# Patient Record
Sex: FEMALE | Race: WHITE | NOT HISPANIC OR LATINO | Employment: FULL TIME | URBAN - METROPOLITAN AREA
[De-identification: names, ages, dates, MRNs, and addresses within clinical notes are randomized per-mention and may not be internally consistent; named-entity substitution may affect disease eponyms.]

---

## 2021-08-25 ENCOUNTER — TRANSFERRED RECORDS (OUTPATIENT)
Dept: MULTI SPECIALTY CLINIC | Facility: CLINIC | Age: 36
End: 2021-08-25

## 2021-08-25 LAB
HPV ABSTRACT: NORMAL
PAP-ABSTRACT: NORMAL

## 2022-05-17 ENCOUNTER — OFFICE VISIT (OUTPATIENT)
Dept: DERMATOLOGY | Facility: CLINIC | Age: 37
End: 2022-05-17
Payer: COMMERCIAL

## 2022-05-17 DIAGNOSIS — R21 RASH AND NONSPECIFIC SKIN ERUPTION: ICD-10-CM

## 2022-05-17 DIAGNOSIS — L20.9 AD (ATOPIC DERMATITIS): Primary | ICD-10-CM

## 2022-05-17 PROCEDURE — 88305 TISSUE EXAM BY PATHOLOGIST: CPT | Mod: 26 | Performed by: DERMATOLOGY

## 2022-05-17 PROCEDURE — 88312 SPECIAL STAINS GROUP 1: CPT | Mod: TC | Performed by: DERMATOLOGY

## 2022-05-17 PROCEDURE — 11104 PUNCH BX SKIN SINGLE LESION: CPT | Performed by: DERMATOLOGY

## 2022-05-17 PROCEDURE — 99203 OFFICE O/P NEW LOW 30 MIN: CPT | Mod: 25 | Performed by: DERMATOLOGY

## 2022-05-17 RX ORDER — CLOBETASOL PROPIONATE 0.5 MG/G
OINTMENT TOPICAL 2 TIMES DAILY
Qty: 60 G | Refills: 3 | Status: SHIPPED | OUTPATIENT
Start: 2022-05-17

## 2022-05-17 RX ORDER — CETIRIZINE HYDROCHLORIDE 5 MG/1
5 TABLET ORAL DAILY
COMMUNITY

## 2022-05-17 RX ORDER — TRIAMCINOLONE ACETONIDE 1 MG/G
OINTMENT TOPICAL
COMMUNITY
Start: 2021-03-16

## 2022-05-17 RX ORDER — FEXOFENADINE HCL 180 MG/1
180 TABLET ORAL DAILY
COMMUNITY

## 2022-05-17 ASSESSMENT — PAIN SCALES - GENERAL
PAINLEVEL: NO PAIN (0)
PAINLEVEL: MILD PAIN (3)

## 2022-05-17 NOTE — PATIENT INSTRUCTIONS
Recommendations for dry skin and dermatitis   1. Bathe or shower daily in lukewarm water  2. Use a gentle non-soap detergent cleanser  - Soaps are alkaline (which can irritate sensitive skin) and remove natural moisturizing factors   - Recommended products, in no particular order, include:   - Bars:    - Aveeno Moisturizing Bar    - Cetaphil Gentle Cleansing Bar    - Dove Sensitive Skin Unscented Beauty Bar    - Olay Ultra Moisture Bar   - Liquid Cleansers:    - Aquanil Cleanser    - CeraVe Hydrating Cleanser    - Cetaphil Gentle Skin Cleanser  - Avoid scented soaps or bath additives unless your doctor tells you otherwise  - Focus on washing the face, underarms, and underwear areas; other sites usually do not need frequent washing  3. Rinse off thoroughly, then pat dry until skin is slightly damp  4. Apply moisturizer to damp skin within 3-5 minutes of exiting the bath/shower  - Recommended products, in no particular order, include:   - Lotions (thinner/lighter, but may be less effective)    - AmLactin Cerapeutic Restoring Body Lotion    - CeraVe Facial Moisturizing Lotion (AM and/or PM)    - Lubriderm Advanced Therapy Lotion   - Creams (thicker, likely the best balance of effectiveness and feel)    - AmLactin Ultra Hydrating Body Cream    - Aveeno Eczema Therapy Moisturizing Cream    - Aveeno Eczema Therapy Itch Relief Balm    - CeraVe Itch Relief Moisturizing Cream   - Ointments (thickest)    - Vaseline  5. If prescribed a topical steroid medication, this may be applied before or after the moisturizer (whichever order you prefer)  6. Reapply moisturizer one or two additional times throughout the day when dry skin is present; once this improves, reduce to daily or every other day as needed to prevent recurrence  7. If dry skin or dermatitis is present on the hands, keep moisturizer near the sink and apply after washing and drying your hands  8. A humidifier may be helpful during the winter months (when ambient  humidity is very low)    Wound Care After a Biopsy    What is a skin biopsy?  A skin biopsy allows the doctor to examine a very small piece of tissue under the microscope to determine the diagnosis and the best treatment for the skin condition. A local anesthetic (numbing medicine)  is injected with a very small needle into the skin area to be tested. A small piece of skin is taken from the area. Sometimes a suture (stitch) is used.     What are the risks of a skin biopsy?  I will experience scar, bleeding, swelling, pain, crusting and redness. I may experience incomplete removal or recurrence. Risks of this procedure are excessive bleeding, bruising, infection, nerve damage, numbness, thick (hypertrophic or keloidal) scar and non-diagnostic biopsy.    How should I care for my wound for the first 24 hours?  Keep the wound dry and covered for 24 hours  If it bleeds, hold direct pressure on the area for 15 minutes. If bleeding does not stop then go to the emergency room  Avoid strenuous exercise the first 1-2 days or as your doctor instructs you    How should I care for the wound after 24 hours?  After 24 hours, remove the bandage  You may bathe or shower as normal  If you had a scalp biopsy, you can shampoo as usual and can use shower water to clean the biopsy site daily  Clean the wound twice a day with gentle soap and water  Do not scrub, be gentle  Apply white petroleum/Vaseline after cleaning the wound with a cotton swab or a clean finger, and keep the site covered with a Bandaid /bandage. Bandages are not necessary with a scalp biopsy  If you are unable to cover the site with a Bandaid /bandage, re-apply ointment 2-3 times a day to keep the site moist. Moisture will help with healing  Avoid strenuous activity for first 1-2 days  Avoid lakes, rivers, pools, and oceans until the stitches are removed or the site is healed    How do I clean my wound?  Wash hands thoroughly with soap or use hand  before all  wound care  Clean the wound with gentle soap and water  Apply white petroleum/Vaseline  to wound after it is clean  Replace the Bandaid /bandage to keep the wound covered for the first few days or as instructed by your doctor  If you had a scalp biopsy, warm shower water to the area on a daily basis should suffice    What should I use to clean my wound?   Cotton-tipped applicators (Qtips )  White petroleum jelly (Vaseline ). Use a clean new container and use Q-tips to apply.  Bandaids   as needed  Gentle soap     How should I care for my wound long term?  Do not get your wound dirty  Keep up with wound care for one week or until the area is healed.  A small scab will form and fall off by itself when the area is completely healed. The area will be red and will become pink in color as it heals. Sun protection is very important for how your scar will turn out. Sunscreen with an SPF 30 or greater is recommended once the area is healed.  If you have stitches, stitches need to be removed in 14 days. You may return to our clinic for this or you may have it done locally at your doctor s office.  You should have some soreness but it should be mild and slowly go away over several days. Talk to your doctor about using tylenol for pain,    When should I call my doctor?  If you have increased:   Pain or swelling  Pus or drainage (clear or slightly yellow drainage is ok)  Temperature over 100F  Spreading redness or warmth around wound    When will I hear about my results?  The biopsy results can take 2 weeks to come back.  Your results will automatically release to [employer] before your provider has even reviewed them.  The clinic will call you with the results, send you a [employer] message, or have you schedule a follow-up clinic or phone time to discuss the results.  Contact our clinics if you do not hear from us in 2 weeks.    Who should I call with questions?  [facility]: [phone]  [place]  [facility]: [phone]  For urgent needs outside of business hours call the [facility] at [phone] and ask for the dermatology resident on call

## 2022-05-17 NOTE — PROGRESS NOTES
[facility] Dermatology Note  Encounter Date: May 17, 2022  Office Visit     Dermatology Problem List:  # History of cutaneous mastocytosis   # Dermatitis   - s/p biopsy showing spongiotic dermatitis with eosinophils on 1/5/21  - TMC 0.1% ointment, clobetasol 0.05% ointment  # Rash, ddx LSC vs LP  - s/p punch bx 5/17/22  - clobetasol 0.05% ointment  ____________________________________________    Assessment & Plan:    # Atopic dermatitis, ddx contact dermatitis   This could be related to an allergy, however I counseled that this can often be difficult to pin point as an exposure every 3 weeks is enough to cause continuous rash. Patient previously discussed patch testing with an outside dermatologist, Dr Fernandez, however patient understandably was not interested in pursuing this. Either way, would treat with a topical steroid. I will prescribe a stronger topical steroid than TMC 0.1% as she is needing to use this continuously to prevent any recurrence. Steroid precautions discussed.   - Dry skin cares discussed and hand out provided  - Start applying clobetasol 0.05% ointment twice daily, tapering with improvement.     # Rash of uncertain etiology, favor lichen simplex chronicus versus lichen planus. Recommended a biopsy for more definitive diagnosis.   - Punch biopsy today, see procedure note below  - Start applying clobetasol 0.05% ointment twice daily, tapering with improvement.     Procedures Performed:   - Punch biopsy procedure note, location(s): L calf. After discussion of benefits and risks including but not limited to bleeding, infection, scar, incomplete removal, recurrence, and non-diagnostic biopsy, written consent and photographs were obtained. The area was cleaned with isopropyl alcohol. 0.5mL of 1% lidocaine with epinephrine was injected to obtain adequate anesthesia and a 4 mm punch biopsy was performed at site(s). 4-0 Prolene sutures were utilized to approximate the epidermal  edges. White petrolatum ointment and a bandage was applied to the wound. Explicit verbal and written wound care instructions were provided. The patient left the dermatology clinic in good condition.     Follow-up: 3 month(s) in-person, or earlier for new or changing lesions    Staff and Scribe:     Scribe Disclosure:  I, Radhika Olivaroughs, am serving as a scribe to document services personally performed by Blaine Fernandez MD based on data collection and the provider's statements to me.     Staff attestation:  The documentation recorded by the scribe accurately reflects the services I personally performed and the decisions I personally made. I have made edits where needed.    Blaine Fernandez MD  Staff Dermatologist and Dermatopathologist  , Department of Dermatology  ____________________________________________    CC: Skin Check (Jessica is here for a skin check and has a rash on her chest and left lower leg that she would like to get checked.)    HPI:  Ms. Cecily Fuentes is a(n) 36 year old female who presents today as a new patient for a few rashes. First, she reports an itchy rash on her central chest. This was biopsied by Dr. Fernandez and it returned as dermatitis. It responds okay to TMC 1-2x daily.  It does not seem to be correlated to wearing a bra. She moisturizes regularly but not daily. She also uses Dove sensitive skin products for washing. 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 For this generalized irritation, she takes Zyrtec and Allegra daily. The spots she had as a baby have faded. She does not believe she has had hives as an adult. No chronic diarrhea. Mom and brother have a history of asthma. No personal history of eczema or asthma.     She points to another rash on her L leg that started about 6 months ago. She has been applying a heavy cream to the area which keeps it at bay. The rash can  by dry and itchy. Patient is otherwise feeling well, without additional skin concerns.    Labs Reviewed:  N/A    Physical Exam:  Vitals: There were no vitals taken for this visit.  SKIN: Focused examination of chest and lower extremities was performed.  - Scattered ecthymatous papules, some in a linear arrangement beneath the L breast. Follicular prominence and a papular eczema appearance on the L chest.   - On the L lateral calf, there is a contiguous cluster of violaceous papules with whitish scale and exaggerated skin lines.  - No other lesions of concern on areas examined.     Medications:  Current Outpatient Medications   Medication     cetirizine (ZYRTEC) 5 MG tablet     fexofenadine (ALLEGRA) 180 MG tablet     triamcinolone (KENALOG) 0.1 % external ointment     No current facility-administered medications for this visit.      Past Medical History:   There is no problem list on file for this patient.    No past medical history on file.     CC Referred Self, MD  No address on file on close of this encounter.

## 2022-05-17 NOTE — LETTER
5/17/2022       RE: Cecily Fuentes  [address]  [facility] [id number]     Dear Colleague,    Thank you for referring your patient, Cecily Fuentes, to the [facility] DERMATOLOGY CLINIC [place] at [facility]. Please see a copy of my visit note below.    [facility] Dermatology Note  Encounter Date: May 17, 2022  Office Visit     Dermatology Problem List:  # History of cutaneous mastocytosis   # Dermatitis   - s/p biopsy showing spongiotic dermatitis with eosinophils on 1/5/21  - TMC 0.1% ointment, clobetasol 0.05% ointment  # Rash, ddx LSC vs LP  - s/p punch bx 5/17/22  - clobetasol 0.05% ointment  ____________________________________________    Assessment & Plan:    # Atopic dermatitis, ddx contact dermatitis   This could be related to an allergy, however I counseled that this can often be difficult to pin point as an exposure every 3 weeks is enough to cause continuous rash. Patient previously discussed patch testing with an outside dermatologist, Dr Fernandez, however patient understandably was not interested in pursuing this. Either way, would treat with a topical steroid. I will prescribe a stronger topical steroid than TMC 0.1% as she is needing to use this continuously to prevent any recurrence. Steroid precautions discussed.   - Dry skin cares discussed and hand out provided  - Start applying clobetasol 0.05% ointment twice daily, tapering with improvement.     # Rash of uncertain etiology, favor lichen simplex chronicus versus lichen planus. Recommended a biopsy for more definitive diagnosis.   - Punch biopsy today, see procedure note below  - Start applying clobetasol 0.05% ointment twice daily, tapering with improvement.     Procedures Performed:   - Punch biopsy procedure note, location(s): L calf. After discussion of benefits and risks including but not limited to bleeding, infection, scar, incomplete removal,  recurrence, and non-diagnostic biopsy, written consent and photographs were obtained. The area was cleaned with isopropyl alcohol. 0.5mL of 1% lidocaine with epinephrine was injected to obtain adequate anesthesia and a 4 mm punch biopsy was performed at site(s). 4-0 Prolene sutures were utilized to approximate the epidermal edges. White petrolatum ointment and a bandage was applied to the wound. Explicit verbal and written wound care instructions were provided. The patient left the dermatology clinic in good condition.     Follow-up: 3 month(s) in-person, or earlier for new or changing lesions    Staff and Scribe:     Scribe Disclosure:  I, Radhika Beckman, am serving as a scribe to document services personally performed by Blaine Fernandez MD based on data collection and the provider's statements to me.     Staff attestation:  The documentation recorded by the scribe accurately reflects the services I personally performed and the decisions I personally made. I have made edits where needed.    Blaine Fernandez MD  Staff Dermatologist and Dermatopathologist  , Department of Dermatology  ____________________________________________    CC: Skin Check (Jessica is here for a skin check and has a rash on her chest and left lower leg that she would like to get checked.)    HPI:  Ms. Cecily Fuentes is a(n) 36 year old female who presents today as a new patient for a few rashes. First, she reports an itchy rash on her central chest. This was biopsied by Dr. Fernandez and it returned as dermatitis. It responds okay to TMC 1-2x daily.  It does not seem to be correlated to wearing a bra. She moisturizes regularly but not daily. She also uses Dove sensitive skin products for washing. 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 For this generalized irritation, she takes Zyrtec and Allegra daily. The spots she had as a baby have  faded. She does not believe she has had hives as an adult. No chronic diarrhea. Mom and brother have a history of asthma. No personal history of eczema or asthma.     She points to another rash on her L leg that started about 6 months ago. She has been applying a heavy cream to the area which keeps it at bay. The rash can by dry and itchy. Patient is otherwise feeling well, without additional skin concerns.    Labs Reviewed:  N/A    Physical Exam:  Vitals: There were no vitals taken for this visit.  SKIN: Focused examination of chest and lower extremities was performed.  - Scattered ecthymatous papules, some in a linear arrangement beneath the L breast. Follicular prominence and a papular eczema appearance on the L chest.   - On the L lateral calf, there is a contiguous cluster of violaceous papules with whitish scale and exaggerated skin lines.  - No other lesions of concern on areas examined.     Medications:  Current Outpatient Medications   Medication     cetirizine (ZYRTEC) 5 MG tablet     fexofenadine (ALLEGRA) 180 MG tablet     triamcinolone (KENALOG) 0.1 % external ointment     No current facility-administered medications for this visit.      Past Medical History:   There is no problem list on file for this patient.    No past medical history on file.     ROMERO Referred Self, MD  No address on file on close of this encounter.      Lidocaine-epinephrine 1-1:661092 % injection   1mL once for one use, starting 5/17/2022 ending 5/17/2022,  2mL disp, R-0, injection  Injected by RASHEEDA Ledezma          Again, thank you for allowing me to participate in the care of your patient.      Sincerely,    Blaine Fernandez MD

## 2022-05-17 NOTE — PROGRESS NOTES
Lidocaine-epinephrine 1-1:883524 % injection   1mL once for one use, starting 5/17/2022 ending 5/17/2022,  2mL disp, R-0, injection  Injected by RASHEEDA Ledezma

## 2022-05-17 NOTE — LETTER
Date:May 31, 2022      Patient was self referred, no letter generated. Do not send.        [facility] Health Information

## 2022-05-19 LAB
PATH REPORT.COMMENTS IMP SPEC: NORMAL
PATH REPORT.FINAL DX SPEC: NORMAL
PATH REPORT.GROSS SPEC: NORMAL
PATH REPORT.MICROSCOPIC SPEC OTHER STN: NORMAL
PATH REPORT.RELEVANT HX SPEC: NORMAL

## 2022-05-29 ENCOUNTER — HEALTH MAINTENANCE LETTER (OUTPATIENT)
Age: 37
End: 2022-05-29

## 2022-08-30 ENCOUNTER — OFFICE VISIT (OUTPATIENT)
Dept: DERMATOLOGY | Facility: CLINIC | Age: 37
End: 2022-08-30
Payer: COMMERCIAL

## 2022-08-30 DIAGNOSIS — L43.0 HYPERTROPHIC LICHEN PLANUS: Primary | ICD-10-CM

## 2022-08-30 DIAGNOSIS — D22.9 MULTIPLE BENIGN MELANOCYTIC NEVI: ICD-10-CM

## 2022-08-30 DIAGNOSIS — L81.4 SOLAR LENTIGO: ICD-10-CM

## 2022-08-30 PROCEDURE — 99213 OFFICE O/P EST LOW 20 MIN: CPT | Performed by: DERMATOLOGY

## 2022-08-30 ASSESSMENT — PAIN SCALES - GENERAL: PAINLEVEL: NO PAIN (0)

## 2022-08-30 NOTE — NURSING NOTE
Dermatology Rooming Note    Cecily Fuentes's goals for this visit include:   Chief Complaint   Patient presents with     Skin Check     Jessica is here for a 3 month skin check.      Dilia Taylor CMA

## 2022-08-30 NOTE — PROGRESS NOTES
[facility] Dermatology Clinic Note - EP    Encounter Date: Aug 30, 2022    Dermatology Problem List:  #. Hypertrophic lichen planus, L shin - biopsy proven  - improved with clobetasol ointment  #. Benign skin checks    ____________________________________________    Assessment & Plan:     #. Hypertrophic lichen planus, L shin - improved  - continue clobetasol 0.05% ointment     #. Benign melanocytic nevi of the trunk  #. Solar lentigines  - reassurance provided; no lesions concerning for malignancy  - photoprotection (regular use of SPF30+ broad spectrum sunscreen and sun protective clothing) recommended  - ABCDE of melanoma discussed    Procedures Performed:  None    Follow-up: 1 year     Blaine Fernandez MD  Dermatology/Dermatopathology Staff Physician  , Department of Dermatology    ____________________________________________    CC: Skin Check (Jessica is here for a 3 month skin check. )      HPI:  Ms. Cecily Fuentes is a(n) extremely pleasant 36 year old female who presents today as a new patient for FBSE. Notes no new lesions of concern.    Repots clobetasol ointment helpful. Using intermittently, has flattened quite a bit and now is asymptomatic.    The patient denies any painful, bleeding, or nonhealing lesions, or any new or changing moles.    Patient is otherwise feeling well, without additional skin concerns.     Labs Reviewed:  N/A    Physical Exam:  Vitals: There were no vitals taken for this visit.  SKIN: Total skin excluding the undergarment areas was performed. The exam included the head/face, neck, both arms, chest, back, abdomen, both legs, digits and/or nails.   - Hyperpigmented thin plaque on the L shin; dramatically improved, nearly flat, no violaceous hue, no signs of activity  - Multiple regular brown pigmented macules and papules are identified on the trunk and extremities.   - There are waxy stuck on tan to brown papules on the trunk and extremities.  - No other  lesions of concern on areas examined.     Medications:  Current Outpatient Medications   Medication     cetirizine (ZYRTEC) 5 MG tablet     clobetasol (TEMOVATE) 0.05 % external ointment     fexofenadine (ALLEGRA) 180 MG tablet     triamcinolone (KENALOG) 0.1 % external ointment     No current facility-administered medications for this visit.      Past Medical History:   There is no problem list on file for this patient.    No past medical history on file.    CC Blaine Fernandez MD  [address] [place] [phone] on close of this encounter.    This note has been created using voice recognition software; while it has been reviewed, some errors may persist.

## 2022-08-30 NOTE — LETTER
8/30/2022       RE: Cecily Fuentes  [address]  [facility] [id number]     Dear Colleague,    Thank you for referring your patient, Cecily Fuentes, to the [facility] DERMATOLOGY CLINIC [place] at [facility]. Please see a copy of my visit note below.    [facility] Dermatology Clinic Note - EP    Encounter Date: Aug 30, 2022    Dermatology Problem List:  #. Hypertrophic lichen planus, L shin - biopsy proven  - improved with clobetasol ointment  #. Benign skin checks    ____________________________________________    Assessment & Plan:     #. Hypertrophic lichen planus, L shin - improved  - continue clobetasol 0.05% ointment     #. Benign melanocytic nevi of the trunk  #. Solar lentigines  - reassurance provided; no lesions concerning for malignancy  - photoprotection (regular use of SPF30+ broad spectrum sunscreen and sun protective clothing) recommended  - ABCDE of melanoma discussed    Procedures Performed:  None    Follow-up: 1 year     Blaine Fernandez MD  Dermatology/Dermatopathology Staff Physician  , Department of Dermatology    ____________________________________________    CC: Skin Check (Jessica is here for a 3 month skin check. )      HPI:  Ms. Cecily Fuentes is a(n) extremely pleasant 36 year old female who presents today as a new patient for FBSE. Notes no new lesions of concern.    Repots clobetasol ointment helpful. Using intermittently, has flattened quite a bit and now is asymptomatic.    The patient denies any painful, bleeding, or nonhealing lesions, or any new or changing moles.    Patient is otherwise feeling well, without additional skin concerns.     Labs Reviewed:  N/A    Physical Exam:  Vitals: There were no vitals taken for this visit.  SKIN: Total skin excluding the undergarment areas was performed. The exam included the head/face, neck, both arms, chest, back, abdomen, both legs, digits  and/or nails.   - Hyperpigmented thin plaque on the L shin; dramatically improved, nearly flat, no violaceous hue, no signs of activity  - Multiple regular brown pigmented macules and papules are identified on the trunk and extremities.   - There are waxy stuck on tan to brown papules on the trunk and extremities.  - No other lesions of concern on areas examined.     Medications:  Current Outpatient Medications   Medication     cetirizine (ZYRTEC) 5 MG tablet     clobetasol (TEMOVATE) 0.05 % external ointment     fexofenadine (ALLEGRA) 180 MG tablet     triamcinolone (KENALOG) 0.1 % external ointment     No current facility-administered medications for this visit.      Past Medical History:   There is no problem list on file for this patient.    No past medical history on file.    CC Blaine Fernandez MD  [address] [place] [phone] on close of this encounter.    This note has been created using voice recognition software; while it has been reviewed, some errors may persist.       Again, thank you for allowing me to participate in the care of your patient.      Sincerely,    Blaine Fernandez MD

## 2022-08-30 NOTE — LETTER
Date:August 30, 2022      Provider requested that no letter be sent. Do not send.       [facility]

## 2022-08-30 NOTE — PROGRESS NOTES
[facility] Dermatology Clinic Note - EP    Encounter Date: Aug 30, 2022    Dermatology Problem List:  #. ***    ____________________________________________    Assessment & Plan:     #. ***   {kkplans:518121}  - ***     #. Benign melanocytic nevi of the trunk  #. Seborrheic keratoses  - reassurance provided; no lesions concerning for malignancy  - photoprotection (regular use of SPF30+ broad spectrum sunscreen and sun protective clothing) recommended  - ABCDE of melanoma discussed    Procedures Performed:   {kkprocedurenotes:940924}  {kkprocedurenotes:140794}    Follow-up: ***    Blaine Fernandez MD  Dermatology/Dermatopathology Staff Physician  , Department of Dermatology    ____________________________________________    CC: Skin Check (Jessica is here for a 3 month skin check. )      HPI:  Ms. Cecily Fuentes is a(n) extremely pleasant 36 year old female who presents today as a new patient for FBSE. Notes no new lesions of concern.    The patient denies any painful, bleeding, or nonhealing lesions, or any new or changing moles.    Patient is otherwise feeling well, without additional skin concerns.     Labs Reviewed:  N/A    Physical Exam:  Vitals: There were no vitals taken for this visit.  SKIN: Total skin excluding the undergarment areas was performed. The exam included the head/face, neck, both arms, chest, back, abdomen, both legs, digits and/or nails.   - {Skin Exam Derm:482484}  - Multiple regular brown pigmented macules and papules are identified on the trunk and extremities.   - There are waxy stuck on tan to brown papules on the trunk and extremities.  - No other lesions of concern on areas examined.     Medications:  Current Outpatient Medications   Medication     cetirizine (ZYRTEC) 5 MG tablet     clobetasol (TEMOVATE) 0.05 % external ointment     fexofenadine (ALLEGRA) 180 MG tablet     triamcinolone (KENALOG) 0.1 % external ointment     No current facility-administered  medications for this visit.      Past Medical History:   There is no problem list on file for this patient.    No past medical history on file.    CC Blaine Fernandez MD  [address] [place] [address] on close of this encounter.    This note has been created using voice recognition software; while it has been reviewed, some errors may persist.

## 2022-10-03 ENCOUNTER — HEALTH MAINTENANCE LETTER (OUTPATIENT)
Age: 37
End: 2022-10-03

## 2023-01-25 ENCOUNTER — TELEPHONE (OUTPATIENT)
Dept: DERMATOLOGY | Facility: CLINIC | Age: 38
End: 2023-01-25

## 2023-06-04 ENCOUNTER — HEALTH MAINTENANCE LETTER (OUTPATIENT)
Age: 38
End: 2023-06-04

## 2023-10-03 ENCOUNTER — OFFICE VISIT (OUTPATIENT)
Dept: DERMATOLOGY | Facility: CLINIC | Age: 38
End: 2023-10-03
Payer: COMMERCIAL

## 2023-10-03 DIAGNOSIS — D22.9 MULTIPLE BENIGN MELANOCYTIC NEVI: ICD-10-CM

## 2023-10-03 DIAGNOSIS — L81.4 SOLAR LENTIGO: ICD-10-CM

## 2023-10-03 DIAGNOSIS — L43.0 HYPERTROPHIC LICHEN PLANUS: Primary | ICD-10-CM

## 2023-10-03 PROCEDURE — 99213 OFFICE O/P EST LOW 20 MIN: CPT | Performed by: DERMATOLOGY

## 2023-10-03 ASSESSMENT — PAIN SCALES - GENERAL: PAINLEVEL: NO PAIN (0)

## 2023-10-03 NOTE — LETTER
10/3/2023       RE: Cecily Fuentes  [address]  [facility] [id number]     Dear Colleague,    Thank you for referring your patient, Cecily Fuentes, to the [facility]ERMAT[facility] [place] [facility]. Please see a copy of my visit note below.    [facility] Dermatology Note  Encounter Date: Oct 3, 2023  Office Visit     Dermatology Problem List:  1. Hypertrophic lichen planus, L shin - biopsy proven  - improved with clobetasol ointment  2. 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 Benign skin checks    ____________________________________________    Assessment & Plan:    # Hypertrophic lichen planus, left shin, improved  - Had been using clobetasol for itching, has since ceased  - Counseled to resume clobetasol if itching returns, increases in elevation, or returns to blue-tinged coloration  - Continue to monitor    # Cherry angiomas, melanocytic nevi, intradermal nevus, seborrheic keratoses.   - NTD: No further management at this time.  - Reassured of benign etiology     # Dermatitis, resolved  - s/p biopsy showing spongiotic dermatitis with eosinophils on 1/5/21  - previous treatment: TMC 0.1% ointment  - resolved with clobetasol 0.05% ointment  - No current areas of scale or itching  - Dry skin cares discussed and hand out provided    Procedures Performed:   None    Follow-up: 1 year(s) in-person, or earlier for new or changing lesions    Staff and Scribe:   I, DELLA HUMPHREY, am serving as a scribe; to document services personally performed by Blaine Fernandez MD -based on data collection and the provider's statements to me.    I Delmy ePrry, saw and examined the patient in the presence of Dr. Fernandez.    Staff Physician:  I was present with the medical student who participated in the service and in the  documentation of the note. I have verified the history and personally performed the physical exam and medical decision making. I agree with the assessment and plan of care as documented in the note.     Blaine Fernandez MD  Staff Dermatologist and Dermatopathologist  , Department of Dermatology   ____________________________________________    CC: Skin Check (FBSC - patient reports no spots of concern. Patient mentions that their scalp becomes sensitive sometimes. )    HPI:  Ms. Cecily Fuentes is a(n) 37 year old female with a history of eczema and hypertrophic lichen planus who presents today as a return patient for FBSE. Jessica reports the lichen planus of her left shin has been much improved. The rash that was previously biopsied on her chest has also resolved following treatment with clobetasol topicals and has not recurred. Currently, there is no itching and the area has flattened. However, there is still significant hyperpigmentation of the area. She has stopped using her clobetasol cream for several months without changes. She denies any rough spots in her mouth or changes in her nails. She does indicate some scalp discomfort to her left posterior head that is improved when she washes with her dandruff shampoo.     Patient is otherwise feeling well, without additional skin concerns.    Labs Reviewed:  N/A    Physical Exam:  Vitals: There were no vitals taken for this visit.  SKIN: Total skin excluding the undergarment areas was performed. The exam included the head/face, neck, both arms, chest, back, abdomen, both legs, digits and/or nails.   - Mild, intermittent flaky skin on scalp  - Hyperpigmented irregular patch of left shin without overlying scale or palpability  - There are dome shaped bright red papules on the trunk and extremities.   - There is a skin colored fleshy papule(s) located on the trunk and extremities.  - Multiple regular brown pigmented macules and papules are  identified on the trunk and extremities.   - There are waxy stuck on tan to brown papules on the face, trunk and extremities.  - No other lesions of concern on areas examined.     Medications:  Current Outpatient Medications   Medication    cetirizine (ZYRTEC) 5 MG tablet    clobetasol (TEMOVATE) 0.05 % external ointment    fexofenadine (ALLEGRA) 180 MG tablet    triamcinolone (KENALOG) 0.1 % external ointment     No current facility-administered medications for this visit.      Past Medical History:   Right ACL surgery       CC Referred Self, MD  No address on file on close of this encounter.

## 2023-10-03 NOTE — PROGRESS NOTES
[facility] Dermatology Note  Encounter Date: Oct 3, 2023  Office Visit     Dermatology Problem List:  1. Hypertrophic lichen planus, L shin - biopsy proven  - improved with clobetasol ointment  2. 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 Benign skin checks    ____________________________________________    Assessment & Plan:    # Hypertrophic lichen planus, left shin, improved  - Had been using clobetasol for itching, has since ceased  - Counseled to resume clobetasol if itching returns, increases in elevation, or returns to blue-tinged coloration  - Continue to monitor    # Cherry angiomas, melanocytic nevi, intradermal nevus, seborrheic keratoses.   - NTD: No further management at this time.  - Reassured of benign etiology     # Dermatitis, resolved  - s/p biopsy showing spongiotic dermatitis with eosinophils on 1/5/21  - previous treatment: TMC 0.1% ointment  - resolved with clobetasol 0.05% ointment  - No current areas of scale or itching  - Dry skin cares discussed and hand out provided    Procedures Performed:   None    Follow-up: 1 year(s) in-person, or earlier for new or changing lesions    Staff and Scribe:   IDELLA, am serving as a scribe; to document services personally performed by Blaine Fernandez MD -based on data collection and the provider's statements to me.    I Delmy Perry, saw and examined the patient in the presence of Dr. Fernandez.    Staff Physician:  I was present with the medical student who participated in the service and in the documentation of the note. I have verified the history and personally performed the physical exam and medical decision making. I agree with the assessment and plan of care as documented in the note.     Blaine Fernandez MD  Staff Dermatologist and Dermatopathologist  , Department of Dermatology    ____________________________________________    CC: Skin Check (FBSC - patient reports no spots of concern. Patient mentions that their scalp becomes sensitive sometimes. )    HPI:  Ms. Cecily Fuentes is a(n) 37 year old female with a history of eczema and hypertrophic lichen planus who presents today as a return patient for FBSE. Jessica reports the lichen planus of her left shin has been much improved. The rash that was previously biopsied on her chest has also resolved following treatment with clobetasol topicals and has not recurred. Currently, there is no itching and the area has flattened. However, there is still significant hyperpigmentation of the area. She has stopped using her clobetasol cream for several months without changes. She denies any rough spots in her mouth or changes in her nails. She does indicate some scalp discomfort to her left posterior head that is improved when she washes with her dandruff shampoo.     Patient is otherwise feeling well, without additional skin concerns.    Labs Reviewed:  N/A    Physical Exam:  Vitals: There were no vitals taken for this visit.  SKIN: Total skin excluding the undergarment areas was performed. The exam included the head/face, neck, both arms, chest, back, abdomen, both legs, digits and/or nails.   - Mild, intermittent flaky skin on scalp  - Hyperpigmented irregular patch of left shin without overlying scale or palpability  - There are dome shaped bright red papules on the trunk and extremities.   - There is a skin colored fleshy papule(s) located on the trunk and extremities.  - Multiple regular brown pigmented macules and papules are identified on the trunk and extremities.   - There are waxy stuck on tan to brown papules on the face, trunk and extremities.  - No other lesions of concern on areas examined.     Medications:  Current Outpatient Medications   Medication     cetirizine (ZYRTEC) 5 MG tablet     clobetasol (TEMOVATE) 0.05 % external  ointment     fexofenadine (ALLEGRA) 180 MG tablet     triamcinolone (KENALOG) 0.1 % external ointment     No current facility-administered medications for this visit.      Past Medical History:   Right ACL surgery       CC Referred Self, MD  No address on file on close of this encounter.

## 2023-10-03 NOTE — NURSING NOTE
Dermatology Rooming Note    Cecily Fuentes's goals for this visit include:   Chief Complaint   Patient presents with    Skin Check     FBS - patient reports no spots of concern. Patient mentions that their scalp becomes sensitive sometimes.      Nallely August

## 2024-02-23 ENCOUNTER — OFFICE VISIT (OUTPATIENT)
Dept: URGENT CARE | Facility: URGENT CARE | Age: 39
End: 2024-02-23
Payer: COMMERCIAL

## 2024-02-23 VITALS
RESPIRATION RATE: 16 BRPM | TEMPERATURE: 98.1 F | OXYGEN SATURATION: 100 % | HEART RATE: 62 BPM | DIASTOLIC BLOOD PRESSURE: 68 MMHG | SYSTOLIC BLOOD PRESSURE: 113 MMHG | WEIGHT: 122 LBS

## 2024-02-23 DIAGNOSIS — H00.012 HORDEOLUM EXTERNUM OF RIGHT LOWER EYELID: Primary | ICD-10-CM

## 2024-02-23 PROCEDURE — 99203 OFFICE O/P NEW LOW 30 MIN: CPT | Performed by: FAMILY MEDICINE

## 2024-02-23 RX ORDER — DOXYCYCLINE HYCLATE 100 MG
100 TABLET ORAL 2 TIMES DAILY
Qty: 14 TABLET | Refills: 0 | Status: SHIPPED | OUTPATIENT
Start: 2024-02-23 | End: 2024-03-01

## 2024-02-23 NOTE — PROGRESS NOTES
Assessment & Plan     Hordeolum externum of right lower eyelid  - doxycycline hyclate (VIBRA-TABS) 100 MG tablet  Dispense: 14 tablet; Refill: 0     We discussed in rare cases do these require surgical management but given this is a small lesion I am hopeful that this will improve with continued use of warm compresses.  I also advised using baby shampoo to clean the surface of the eye to remove bacteria or blockage of oil that is preventing this from resolving.  She has the option of using oral doxycycline for a week, rx provided.  I am recommend that she stop the Tobrex at this time.    Referral for eye surgeon was provided to [facility] who specializes in ocular plastic surgery should she need further follow-up    See AVS summary for additional recommendations reviewed with patient during this visit.         Som Parker MD   [place] [facility]    Kirstin Lopez is a 38 year old female who presents to clinic today for the following health issues:  Chief Complaint   Patient presents with    Urgent Care     Stye in the right, was treated with antibiotic for a week now     HPI    She does have a history of acute looking eye allergies and wonders if this area caused inflammation causing this new stye.  She has had styes in the past which resolved within a couple weeks of using compresses.  She is currently 6 weeks and had this presentation but symptoms have been worsening.  She did do a telemetry doc visit where they gave her Tobrex she has not seen any improvement.  Symptoms are not worsening.      There are no problems to display for this patient.      Current Outpatient Medications   Medication    cetirizine (ZYRTEC) 5 MG tablet    clobetasol (TEMOVATE) 0.05 % external ointment    doxycycline hyclate (VIBRA-TABS) 100 MG tablet    fexofenadine (ALLEGRA) 180 MG tablet    triamcinolone (KENALOG) 0.1 % external ointment     No current facility-administered medications for this visit.          Objective    /68   Pulse 62   Temp 98.1  F (36.7  C)   Resp 16   Wt 55.3 kg (122 lb)   SpO2 100%   Physical Exam       Right lower eyelid lateral inflamed section <0.5cm. no active discharge      No results found for any visits on 02/23/24.                  The use of Dragon/Handyation services may have been used to construct the content in this note; any grammatical or spelling errors are non-intentional. Please contact the author of this note directly if you are in need of any clarification.

## 2024-02-23 NOTE — PATIENT INSTRUCTIONS
Use diluted baby shampoo to clean the surface of the eyelid twice a day      If eye feels dry/irritated/gritty use artificial tears/ generic version of systane    If no improvement in next few days or slightly worsening start the oral doxycycline antibiotic      Continue warm compresses      If symptoms continue or worsen call for an appointment to see eye surgeon    Ji Gilmore MD ([facility] [phone])  Let them know you have a persistent stye present for months that hasn't improved despite tobrex/compresses/baby shampoo

## 2024-03-01 ENCOUNTER — OFFICE VISIT (OUTPATIENT)
Dept: FAMILY MEDICINE | Facility: CLINIC | Age: 39
End: 2024-03-01
Payer: COMMERCIAL

## 2024-03-01 VITALS
SYSTOLIC BLOOD PRESSURE: 110 MMHG | TEMPERATURE: 99.3 F | RESPIRATION RATE: 12 BRPM | OXYGEN SATURATION: 100 % | HEART RATE: 62 BPM | DIASTOLIC BLOOD PRESSURE: 70 MMHG | BODY MASS INDEX: 20.62 KG/M2 | HEIGHT: 64 IN | WEIGHT: 120.8 LBS

## 2024-03-01 DIAGNOSIS — Z13.0 SCREENING FOR IRON DEFICIENCY ANEMIA: ICD-10-CM

## 2024-03-01 DIAGNOSIS — Z13.29 SCREENING FOR THYROID DISORDER: ICD-10-CM

## 2024-03-01 DIAGNOSIS — Z13.220 LIPID SCREENING: ICD-10-CM

## 2024-03-01 DIAGNOSIS — Z00.00 ROUTINE GENERAL MEDICAL EXAMINATION AT A HEALTH CARE FACILITY: Primary | ICD-10-CM

## 2024-03-01 DIAGNOSIS — Z13.1 SCREENING FOR DIABETES MELLITUS: ICD-10-CM

## 2024-03-01 LAB
ERYTHROCYTE [DISTWIDTH] IN BLOOD BY AUTOMATED COUNT: 14.6 % (ref 10–15)
HCT VFR BLD AUTO: 41.9 % (ref 35–47)
HGB BLD-MCNC: 13.3 G/DL (ref 11.7–15.7)
MCH RBC QN AUTO: 27.9 PG (ref 26.5–33)
MCHC RBC AUTO-ENTMCNC: 31.7 G/DL (ref 31.5–36.5)
MCV RBC AUTO: 88 FL (ref 78–100)
PLATELET # BLD AUTO: 297 10E3/UL (ref 150–450)
RBC # BLD AUTO: 4.76 10E6/UL (ref 3.8–5.2)
WBC # BLD AUTO: 6.7 10E3/UL (ref 4–11)

## 2024-03-01 PROCEDURE — 84443 ASSAY THYROID STIM HORMONE: CPT | Performed by: NURSE PRACTITIONER

## 2024-03-01 PROCEDURE — 80053 COMPREHEN METABOLIC PANEL: CPT | Performed by: NURSE PRACTITIONER

## 2024-03-01 PROCEDURE — 99395 PREV VISIT EST AGE 18-39: CPT | Performed by: NURSE PRACTITIONER

## 2024-03-01 PROCEDURE — 85027 COMPLETE CBC AUTOMATED: CPT | Performed by: NURSE PRACTITIONER

## 2024-03-01 PROCEDURE — 36415 COLL VENOUS BLD VENIPUNCTURE: CPT | Performed by: NURSE PRACTITIONER

## 2024-03-01 PROCEDURE — 80061 LIPID PANEL: CPT | Performed by: NURSE PRACTITIONER

## 2024-03-01 SDOH — HEALTH STABILITY: PHYSICAL HEALTH: ON AVERAGE, HOW MANY DAYS PER WEEK DO YOU ENGAGE IN MODERATE TO STRENUOUS EXERCISE (LIKE A BRISK WALK)?: 3 DAYS

## 2024-03-01 ASSESSMENT — SOCIAL DETERMINANTS OF HEALTH (SDOH): HOW OFTEN DO YOU GET TOGETHER WITH FRIENDS OR RELATIVES?: TWICE A WEEK

## 2024-03-01 ASSESSMENT — PAIN SCALES - GENERAL: PAINLEVEL: NO PAIN (0)

## 2024-03-01 NOTE — PROGRESS NOTES
Preventive Care Visit  [facility]  LYN Billings CNP, Nurse Practitioner Primary Care  Mar 1, 2024    Assessment & Plan     Routine general medical examination at a health care facility  Reviewed medical/social/family history and health maintenance     Screening for thyroid disorder  - TSH with free T4 reflex; Future  - TSH with free T4 reflex    Screening for iron deficiency anemia  - CBC with platelets; Future  - CBC with platelets    Lipid screening  - Lipid panel reflex to direct LDL Non-fasting; Future  - Lipid panel reflex to direct LDL Non-fasting    Screening for diabetes mellitus  - Comprehensive metabolic panel (BMP + Alb, Alk Phos, ALT, AST, Total. Bili, TP); Future  - Comprehensive metabolic panel (BMP + Alb, Alk Phos, ALT, AST, Total. Bili, TP)        Counseling  Appropriate preventive services were discussed with this patient, including applicable screening as appropriate for fall prevention, nutrition, physical activity, Tobacco-use cessation, weight loss and cognition.  Checklist reviewing preventive services available has been given to the patient.  Reviewed patient's diet, addressing concerns and/or questions.   She is at risk for lack of exercise and has been provided with information to increase physical activity for the benefit of her well-being.   She is at risk for psychosocial distress and has been provided with information to reduce risk.           Kirstin Lopez is a 38 year old, presenting for the following:  Physical        3/1/2024    12:41 PM   Additional Questions   Roomed by Aimee ASCENCIO        Via the Health Maintenance questionnaire, the patient has reported the following services have been completed -Cervical Cancer Screening, this information has been sent to the abstraction team.  Health Care Directive  Patient does not have a Health Care Directive or Living Will: Discussed advance care planning with patient; information given to patient to  review.    HPI        3/1/2024   General Health   How would you rate your overall physical health? Good   Feel stress (tense, anxious, or unable to sleep) Only a little   (!) STRESS CONCERN      3/1/2024   Nutrition   Three or more servings of calcium each day? Yes   Diet: Regular (no restrictions)   How many servings of fruit and vegetables per day? (!) 2-3   How many sweetened beverages each day? 0-1         3/1/2024   Exercise   Days per week of moderate/strenous exercise 3 days         3/1/2024   Social Factors   Frequency of gathering with friends or relatives Twice a week   Worry food won't last until get money to buy more No   Food not last or not have enough money for food? No   Do you have housing?  Yes   Are you worried about losing your housing? No   Lack of transportation? No   Unable to get utilities (heat,electricity)? No         3/1/2024   Dental   Dentist two times every year? Yes         3/1/2024   TB Screening   Were you born outside of [place]?  No         Today's PHQ-2 Score:       3/1/2024    12:35 PM   PHQ-2 ( 1999 [employer])   Q1: Little interest or pleasure in doing things 0   Q2: Feeling down, depressed or hopeless 0   PHQ-2 Score 0   Q1: Little interest or pleasure in doing things Not at all   Q2: Feeling down, depressed or hopeless Not at all   PHQ-2 Score 0           3/1/2024   Substance Use   Alcohol more than 3/day or more than 7/wk No   Do you use any other substances recreationally? (!) ALCOHOL     Social History     Tobacco Use    Smoking status: Never    Smokeless tobacco: Never             3/1/2024   Breast Cancer Screening   Family history of breast, colon, or ovarian cancer? Yes         3/1/2024   LAST FHS-7 RESULTS   1st degree relative breast or ovarian cancer No   Any relative bilateral breast cancer Unknown   Any male have breast cancer No   Any ONE woman have BOTH breast AND ovarian cancer No   Any woman with breast cancer before 50yrs No   2 or more relatives with breast AND/OR  "ovarian cancer Yes   2 or more relatives with breast AND/OR bowel cancer Yes                  3/1/2024   One time HIV Screening   Previous HIV test? I don't know         3/1/2024   STI Screening   New sexual partner(s) since last STI/HIV test? No     History of abnormal Pap smear: NO - age 30-65 PAP every 5 years with negative HPV co-testing recommended             3/1/2024   Contraception/Family Planning   Questions about contraception or family planning No        Reviewed and updated as needed this visit by Provider                             Objective    Exam  /70 (BP Location: Right arm, Patient Position: Sitting, Cuff Size: Adult Regular)   Pulse 62   Temp 99.3  F (37.4  C) (Temporal)   Resp 12   Ht 1.613 m (5' 3.5\")   Wt 54.8 kg (120 lb 12.8 oz)   LMP 02/09/2024 (Approximate)   SpO2 100%   BMI 21.06 kg/m     Estimated body mass index is 21.06 kg/m  as calculated from the following:    Height as of this encounter: 1.613 m (5' 3.5\").    Weight as of this encounter: 54.8 kg (120 lb 12.8 oz).    Physical Exam  GENERAL: alert and no distress  EYES: Eyes grossly normal to inspection, PERRL and conjunctivae and sclerae normal  HENT: ear canals and TM's normal, nose and mouth without ulcers or lesions  NECK: no adenopathy, no asymmetry, masses, or scars  RESP: lungs clear to auscultation - no rales, rhonchi or wheezes  BREAST: normal without masses, tenderness or nipple discharge and no palpable axillary masses or adenopathy  CV: regular rate and rhythm, normal S1 S2, no S3 or S4, no murmur, click or rub, no peripheral edema  ABDOMEN: soft, nontender, no hepatosplenomegaly, no masses and bowel sounds normal  MS: no gross musculoskeletal defects noted, no edema  SKIN: no suspicious lesions or rashes  NEURO: Normal strength and tone, mentation intact and speech normal  PSYCH: mentation appears normal, affect normal/bright      Signed Electronically by: LYN Billings CNP    "

## 2024-03-01 NOTE — PATIENT INSTRUCTIONS
Preventive Care Advice   This is general advice given by our system to help you stay healthy. However, your care team may have specific advice just for you. Please talk to your care team about your preventive care needs.  Nutrition  Eat 5 or more servings of fruits and vegetables each day.  Try wheat bread, brown rice and whole grain pasta (instead of white bread, rice, and pasta).  Get enough calcium and vitamin D. Check the label on foods and aim for 100% of the RDA (recommended daily allowance).  Lifestyle  Exercise at least 150 minutes each week   (30 minutes a day, 5 days a week).  Do muscle strengthening activities 2 days a week. These help control your weight and prevent disease.  No smoking.  Wear sunscreen to prevent skin cancer.  Have a dental exam and cleaning every 6 months.  Yearly exams  See your health care team every year to talk about:  Any changes in your health.  Any medicines your care team has prescribed.  Preventive care, family planning, and ways to prevent chronic diseases.  Shots (vaccines)   HPV shots (up to age 26), if you've never had them before.  Hepatitis B shots (up to age 59), if you've never had them before.  COVID-19 shot: Get this shot when it's due.  Flu shot: Get a flu shot every year.  Tetanus shot: Get a tetanus shot every 10 years.  Pneumococcal, hepatitis A, and RSV shots: Ask your care team if you need these based on your risk.  Shingles shot (for age 50 and up).  General health tests  Diabetes screening:  Starting at age 35, Get screened for diabetes at least every 3 years.  If you are younger than age 35, ask your care team if you should be screened for diabetes.  Cholesterol test: At age 39, start having a cholesterol test every 5 years, or more often if advised.  Bone density scan (DEXA): At age 50, ask your care team if you should have this scan for osteoporosis (brittle bones).  Hepatitis C: Get tested at least once in your life.  STIs (sexually transmitted  infections)  Before age 24: Ask your care team if you should be screened for STIs.  After age 24: Get screened for STIs if you're at risk. You are at risk for STIs (including HIV) if:  You are sexually active with more than one person.  You don't use condoms every time.  You or a partner was diagnosed with a sexually transmitted infection.  If you are at risk for HIV, ask about PrEP medicine to prevent HIV.  Get tested for HIV at least once in your life, whether you are at risk for HIV or not.  Cancer screening tests  Cervical cancer screening: If you have a cervix, begin getting regular cervical cancer screening tests at age 21. Most people who have regular screenings with normal results can stop after age 65. Talk about this with your provider.  Breast cancer scan (mammogram): If you've ever had breasts, begin having regular mammograms starting at age 40. This is a scan to check for breast cancer.  Colon cancer screening: It is important to start screening for colon cancer at age 45.  Have a colonoscopy test every 10 years (or more often if you're at risk) Or, ask your provider about stool tests like a FIT test every year or Cologuard test every 3 years.  To learn more about your testing options, visit: [email or web address]://[email or web address].  For help making a decision, visit: https://bit.ly/jf44998.  Prostate cancer screening test: If you have a prostate and are age 55 to 69, ask your provider if you would benefit from a yearly prostate cancer screening test.  Lung cancer screening: If you are a current or former smoker age 50 to 80, ask your care team if ongoing lung cancer screenings are right for you.  For informational purposes only. Not to replace the advice of your health care provider. Copyright   2023 [employer]. All rights reserved. Clinically reviewed by the [facility] Transitions Program. [employer] 143160 - REV 01/24.    Learning About Stress  What is stress?     Stress is your  body's response to a hard situation. Your body can have a physical, emotional, or mental response. Stress is a fact of life for most people, and it affects everyone differently. What causes stress for you may not be stressful for someone else.  A lot of things can cause stress. You may feel stress when you go on a job interview, take a test, or run a race. This kind of short-term stress is normal and even useful. It can help you if you need to work hard or react quickly. For example, stress can help you finish an important job on time.  Long-term stress is caused by ongoing stressful situations or events. Examples of long-term stress include long-term health problems, ongoing problems at work, or conflicts in your family. Long-term stress can harm your health.  How does stress affect your health?  When you are stressed, your body responds as though you are in danger. It makes hormones that speed up your heart, make you breathe faster, and give you a burst of energy. This is called the fight-or-flight stress response. If the stress is over quickly, your body goes back to normal and no harm is done.  But if stress happens too often or lasts too long, it can have bad effects. Long-term stress can make you more likely to get sick, and it can make symptoms of some diseases worse. If you tense up when you are stressed, you may develop neck, shoulder, or low back pain. Stress is linked to high blood pressure and heart disease.  Stress also harms your emotional health. It can make you reynolds, tense, or depressed. Your relationships may suffer, and you may not do well at work or school.  What can you do to manage stress?  You can try these things to help manage stress:   Do something active. Exercise or activity can help reduce stress. Walking is a great way to get started. Even everyday activities such as housecleaning or yard work can help.  Try yoga or dania chi. These techniques combine exercise and meditation. You may need  some training at first to learn them.  Do something you enjoy. For example, listen to music or go to a movie. Practice your hobby or do volunteer work.  Meditate. This can help you relax, because you are not worrying about what happened before or what may happen in the future.  Do guided imagery. Imagine yourself in any setting that helps you feel calm. You can use online videos, books, or a teacher to guide you.  Do breathing exercises. For example:  From a standing position, bend forward from the waist with your knees slightly bent. Let your arms dangle close to the floor.  Breathe in slowly and deeply as you return to a standing position. Roll up slowly and lift your head last.  Hold your breath for just a few seconds in the standing position.  Breathe out slowly and bend forward from the waist.  Let your feelings out. Talk, laugh, cry, and express anger when you need to. Talking with supportive friends or family, a counselor, or a tiffanie leader about your feelings is a healthy way to relieve stress. Avoid discussing your feelings with people who make you feel worse.  Write. It may help to write about things that are bothering you. This helps you find out how much stress you feel and what is causing it. When you know this, you can find better ways to cope.  What can you do to prevent stress?  You might try some of these things to help prevent stress:  Manage your time. This helps you find time to do the things you want and need to do.  Get enough sleep. Your body recovers from the stresses of the day while you are sleeping.  Get support. Your family, friends, and community can make a difference in how you experience stress.  Limit your news feed. Avoid or limit time on social media or news that may make you feel stressed.  Do something active. Exercise or activity can help reduce stress. Walking is a great way to get started.  Where can you learn more?  Go to https://www.healthwise.net/patiented  Enter N032 in the  "search box to learn more about \"Learning About Stress.\"  Current as of: February 26, 2023               Content Version: 13.8    1530-5898 [employer].   Care instructions adapted under license by your healthcare professional. If you have questions about a medical condition or this instruction, always ask your healthcare professional. [employer] disclaims any warranty or liability for your use of this information.      Substance Use Disorder: Care Instructions  Overview     You can improve your life and health by stopping your use of alcohol or drugs. When you don't drink or use drugs, you may feel and sleep better. You may get along better with your family, friends, and coworkers. There are medicines and programs that can help with substance use disorder.  How can you care for yourself at home?  Here are some ways to help you stay sober and prevent relapse.  If you have been given medicine to help keep you sober or reduce your cravings, be sure to take it exactly as prescribed.  Talk to your doctor about programs that can help you stop using drugs or drinking alcohol.  Do not keep alcohol or drugs in your home.  Plan ahead. Think about what you'll say if other people ask you to drink or use drugs. Try not to spend time with people who drink or use drugs.  Use the time and money spent on drinking or drugs to do something that's important to you.  Preventing a relapse  Have a plan to deal with relapse. Learn to recognize changes in your thinking that lead you to drink or use drugs. Get help before you start to drink or use drugs again.  Try to stay away from situations, friends, or places that may lead you to drink or use drugs.  If you feel the need to drink alcohol or use drugs again, seek help right away. Call a trusted friend or family member. Some people get support from organizations such as Narcotics Anonymous or [employer] or from treatment facilities.  If you relapse, get help " as soon as you can. Some people make a plan with another person that outlines what they want that person to do for them if they relapse. The plan usually includes how to handle the relapse and who to notify in case of relapse.  Don't give up. Remember that a relapse doesn't mean that you have failed. Use the experience to learn the triggers that lead you to drink or use drugs. Then quit again. Recovery is a lifelong process. Many people have several relapses before they are able to quit for good.  Follow-up care is a key part of your treatment and safety. Be sure to make and go to all appointments, and call your doctor if you are having problems. It's also a good idea to know your test results and keep a list of the medicines you take.  When should you call for help?   Call 911  anytime you think you may need emergency care. For example, call if you or someone else:    Has overdosed or has withdrawal signs. Be sure to tell the emergency workers that you are or someone else is using or trying to quit using drugs. Overdose or withdrawal signs may include:  Losing consciousness.  Seizure.  Seeing or hearing things that aren't there (hallucinations).     Is thinking or talking about suicide or harming others.   Where to get help 24 hours a day, 7 days a week   If you or someone you know talks about suicide, self-harm, a mental health crisis, a substance use crisis, or any other kind of emotional distress, get help right away. You can:    Call the Suicide and Crisis Lifeline at 98.     Call 5-185-866-TALK (1-818.471.3685).     Text HOME to 501486 to access the Crisis Text Line.   Consider saving these numbers in your phone.  Go to [employer] for more information or to chat online.  Call your doctor now or seek immediate medical care if:    You are having withdrawal symptoms. These may include nausea or vomiting, sweating, shakiness, and anxiety.   Watch closely for changes in your health, and be sure to contact  "your doctor if:    You have a relapse.     You need more help or support to stop.   Where can you learn more?  Go to https://www.[employer]opkan[employer].net/patiented  Enter H573 in the search box to learn more about \"Substance Use Disorder: Care Instructions.\"  Current as of: March 21, 2023               Content Version: 13.8    0886-6457 [employer].   Care instructions adapted under license by your healthcare professional. If you have questions about a medical condition or this instruction, always ask your healthcare professional. Healthwise, [employer] disclaims any warranty or liability for your use of this information.      "

## 2024-03-02 LAB
ALBUMIN SERPL BCG-MCNC: 4.6 G/DL (ref 3.5–5.2)
ALP SERPL-CCNC: 56 U/L (ref 40–150)
ALT SERPL W P-5'-P-CCNC: 19 U/L (ref 0–50)
ANION GAP SERPL CALCULATED.3IONS-SCNC: 10 MMOL/L (ref 7–15)
AST SERPL W P-5'-P-CCNC: 24 U/L (ref 0–45)
BILIRUB SERPL-MCNC: 0.3 MG/DL
BUN SERPL-MCNC: 11.5 MG/DL (ref 6–20)
CALCIUM SERPL-MCNC: 9.6 MG/DL (ref 8.6–10)
CHLORIDE SERPL-SCNC: 105 MMOL/L (ref 98–107)
CHOLEST SERPL-MCNC: 172 MG/DL
CREAT SERPL-MCNC: 0.68 MG/DL (ref 0.51–0.95)
DEPRECATED HCO3 PLAS-SCNC: 27 MMOL/L (ref 22–29)
EGFRCR SERPLBLD CKD-EPI 2021: >90 ML/MIN/1.73M2
FASTING STATUS PATIENT QL REPORTED: NO
GLUCOSE SERPL-MCNC: 91 MG/DL (ref 70–99)
HDLC SERPL-MCNC: 71 MG/DL
LDLC SERPL CALC-MCNC: 91 MG/DL
NONHDLC SERPL-MCNC: 101 MG/DL
POTASSIUM SERPL-SCNC: 4.2 MMOL/L (ref 3.4–5.3)
PROT SERPL-MCNC: 7.4 G/DL (ref 6.4–8.3)
SODIUM SERPL-SCNC: 142 MMOL/L (ref 135–145)
TRIGL SERPL-MCNC: 52 MG/DL
TSH SERPL DL<=0.005 MIU/L-ACNC: 1.59 UIU/ML (ref 0.3–4.2)

## 2024-10-08 ENCOUNTER — OFFICE VISIT (OUTPATIENT)
Dept: DERMATOLOGY | Facility: CLINIC | Age: 39
End: 2024-10-08
Payer: COMMERCIAL

## 2024-10-08 DIAGNOSIS — L28.1 PRURIGO NODULARIS: ICD-10-CM

## 2024-10-08 DIAGNOSIS — D22.9 MULTIPLE BENIGN MELANOCYTIC NEVI: ICD-10-CM

## 2024-10-08 DIAGNOSIS — L43.0 HYPERTROPHIC LICHEN PLANUS: Primary | ICD-10-CM

## 2024-10-08 DIAGNOSIS — D18.01 CHERRY ANGIOMA: ICD-10-CM

## 2024-10-08 PROCEDURE — 99213 OFFICE O/P EST LOW 20 MIN: CPT | Performed by: DERMATOLOGY

## 2024-10-08 ASSESSMENT — PAIN SCALES - GENERAL: PAINLEVEL: NO PAIN (0)

## 2024-10-08 NOTE — PROGRESS NOTES
[facility] Dermatology Note  Encounter Date: Oct 8, 2024  Office Visit     Dermatology Problem List:  1. Hypertrophic lichen planus, L shin - biopsy proven  - improved with clobetasol ointment  2. 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 Benign skin checks       ____________________________________________    Assessment & Plan:    #     # {Diagnosesderm:263268}.   {kkplans:759181}   - ***     # {Diagnosesderm:255914}.   {kkplans:168441}   - ***     Procedures Performed:   {kkprocedurenotes:719895}  {kkprocedurenotes:651585}    Follow-up: {kkfollowup:313370}    Staff and Scribe:   IDELLA, am serving as a scribe; to document services personally performed by Blaine Fernandez MD -based on data collection and the provider's statements to me.    ***  ____________________________________________    CC: No chief complaint on file.    HPI:  Ms. Cecily Fuentes is a(n) 38 year old female who presents today as a return patient for ***      Patient is otherwise feeling well, without additional skin concerns.    Labs Reviewed:  N/A    Physical Exam:  Vitals: There were no vitals taken for this visit.  SKIN: {kkSkinExam:081401}  -   - ***  - {Skin Exam Derm:698158}.   - {Skin Exam Derm:520389}.   - {Skin Exam Derm:671590}.   - No other lesions of concern on areas examined.     Medications:  Current Outpatient Medications   Medication Sig Dispense Refill    cetirizine (ZYRTEC) 5 MG tablet Take 5 mg by mouth daily      clobetasol (TEMOVATE) 0.05 % external ointment Apply topically 2 times daily (Patient not taking: Reported on 3/1/2024) 60 g 3    fexofenadine (ALLEGRA) 180 MG tablet Take 180 mg by mouth daily      triamcinolone (KENALOG) 0.1 % external ointment  (Patient not taking: Reported on 3/1/2024)       No current facility-administered medications for this visit.      Past Medical  History:   There is no problem list on file for this patient.    No past medical history on file.     CC Referred Self, MD  No address on file on close of this encounter.

## 2024-10-08 NOTE — LETTER
10/8/2024       RE: Cecily Fuentes  [address]  [place] [address]     Dear Colleague,    Thank you for referring your patient, Cecily Fuentes, to the [facility] [place] [facility]. Please see a copy of my visit note below.    [facility] Dermatology Note  Encounter Date: Oct 8, 2024  Office Visit     Dermatology Problem List:  1. Hypertrophic lichen planus, L shin - biopsy proven  - improved with clobetasol ointment, not currently using  2. 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 Has Triam 1% ointment available  3. Benign skin checks - cherry angiomas, ~10 nevi, prurigo nodule    ____________________________________________    Assessment & Plan:    # Hypertrophic lichen planus, L shin - biopsy proven  - NTD today. Continue to monitor for any changes.    # Dermatitis, chest  - Biopsied in 2021 showing spongiotic dermatitis. Occasional recurrence. Continue to use clobetasol PRN. Okay for PCP to prescribe.    # Benign skin checks - cherry angiomas, nevi, prurigo nodule  - No history of biopsied lesions. Benign skin checks. Not a moley person. Okay for skin checks with PCP or derm clinic.    Procedures Performed:   None    Follow-up: 1 year(s) in person or virtually, or earlier for new or changing lesions    Staff and Medical Student:     Branden Mehta, MS4, [facility]    This visit was completed in conjunction with Dr. Fernandez. The information contained in this note is a result of our shared decision making process. All history, PE, results and medical decision making was reviewed by Dr. Fernandez.    Staff Physician:  I was present with the medical student who participated in the service and in the documentation of the note. I have verified the history and personally performed the physical  exam and medical decision making. I agree with the assessment and plan of care as documented in the note.     Blaine Fernandez MD  Staff Dermatologist and Dermatopathologist  , Department of Dermatology     ____________________________________________    CC: Derm Problem (FBSE - small spot on upper right forehead, otherwise no other concerns)    HPI:  Ms. 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  She reports that occasionally she has return of erythema and pruitis to her chest and uses the clobetasol with resolution of the rash (every few months). Reports Discoloration to her left leg is still present, but no pain, itchiness or other complaints to there area. Has had two stye to her right eye over the last few months, used abx ointment and compresses and went away over months.    Patient is otherwise feeling well, without additional skin concerns.    Labs:  None reviewed.    Physical Exam:  Vitals: There were no vitals taken for this visit.  SKIN: Total skin excluding the undergarment areas was performed. The exam included the head/face, neck, both arms, chest, back, abdomen, both legs, digits and/or nails.   - Few brown macules to extremities, face and trunk, largest about 1cm to left dorsal hand, reticular in appearance  - 0.5 mm papule with slight central scale and two small open spots to right forehead  - Slight hyperpigmentation of skin to left leg just below knee  - Chest free of erythema, papules and scale  - No other lesions of concern on areas examined.     Medications:  Current Outpatient Medications   Medication Sig Dispense Refill     cetirizine (ZYRTEC) 5 MG tablet Take 5 mg by mouth daily       fexofenadine (ALLEGRA) 180 MG tablet Take 180 mg by mouth daily       clobetasol (TEMOVATE) 0.05 % external ointment Apply topically 2 times daily (Patient not taking: Reported on 3/1/2024) 60 g 3      triamcinolone (KENALOG) 0.1 % external ointment  (Patient not taking: Reported on 3/1/2024)       No current facility-administered medications for this visit.      Past Medical History:   There is no problem list on file for this patient.    History reviewed. No pertinent past medical history.     CC Referred Self, MD  No address on file on close of this encounter.      Again, thank you for allowing me to participate in the care of your patient.      Sincerely,    Blaine Fernandez MD

## 2024-10-08 NOTE — NURSING NOTE
Dermatology Rooming Note    Cecily Fuentes's goals for this visit include:   Chief Complaint   Patient presents with    Derm Problem     FBSE - small spot on upper right forehead, otherwise no other concerns     Kelly Enriquez, EMT

## 2024-10-08 NOTE — PROGRESS NOTES
[facility] Dermatology Note  Encounter Date: Oct 8, 2024  Office Visit     Dermatology Problem List:  1. Hypertrophic lichen planus, L shin - biopsy proven  - improved with clobetasol ointment, not currently using  2. 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 Has Triam 1% ointment available  3. Benign skin checks - cherry angiomas, ~10 nevi, prurigo nodule    ____________________________________________    Assessment & Plan:    # Hypertrophic lichen planus, L shin - biopsy proven  - NTD today. Continue to monitor for any changes.    # Dermatitis, chest  - Biopsied in 2021 showing spongiotic dermatitis. Occasional recurrence. Continue to use clobetasol PRN. Okay for PCP to prescribe.    # Benign skin checks - cherry angiomas, nevi, prurigo nodule  - No history of biopsied lesions. Benign skin checks. Not a moley person. Okay for skin checks with PCP or derm clinic.    Procedures Performed:   None    Follow-up: 1 year(s) in person or virtually, or earlier for new or changing lesions    Staff and Medical Student:     Branden Mehta, MS4, [facility]    This visit was completed in conjunction with Dr. Fernandez. The information contained in this note is a result of our shared decision making process. All history, PE, results and medical decision making was reviewed by Dr. Fernandez.    Staff Physician:  I was present with the medical student who participated in the service and in the documentation of the note. I have verified the history and personally performed the physical exam and medical decision making. I agree with the assessment and plan of care as documented in the note.     Blaine Fernandez MD  Staff Dermatologist and Dermatopathologist  , Department of Dermatology     ____________________________________________    CC: Derm Problem (FBSE - small spot on upper right  forehead, otherwise no other concerns)    HPI:  Ms. 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  She reports that occasionally she has return of erythema and pruitis to her chest and uses the clobetasol with resolution of the rash (every few months). Reports Discoloration to her left leg is still present, but no pain, itchiness or other complaints to there area. Has had two stye to her right eye over the last few months, used abx ointment and compresses and went away over months.    Patient is otherwise feeling well, without additional skin concerns.    Labs:  None reviewed.    Physical Exam:  Vitals: There were no vitals taken for this visit.  SKIN: Total skin excluding the undergarment areas was performed. The exam included the head/face, neck, both arms, chest, back, abdomen, both legs, digits and/or nails.   - Few brown macules to extremities, face and trunk, largest about 1cm to left dorsal hand, reticular in appearance  - 0.5 mm papule with slight central scale and two small open spots to right forehead  - Slight hyperpigmentation of skin to left leg just below knee  - Chest free of erythema, papules and scale  - No other lesions of concern on areas examined.     Medications:  Current Outpatient Medications   Medication Sig Dispense Refill    cetirizine (ZYRTEC) 5 MG tablet Take 5 mg by mouth daily      fexofenadine (ALLEGRA) 180 MG tablet Take 180 mg by mouth daily      clobetasol (TEMOVATE) 0.05 % external ointment Apply topically 2 times daily (Patient not taking: Reported on 3/1/2024) 60 g 3    triamcinolone (KENALOG) 0.1 % external ointment  (Patient not taking: Reported on 3/1/2024)       No current facility-administered medications for this visit.      Past Medical History:   There is no problem list on file for this patient.    History reviewed. No pertinent past medical history.     CC Referred MD Steven  No address on  file on close of this encounter.

## 2024-10-30 ENCOUNTER — IMMUNIZATION (OUTPATIENT)
Dept: FAMILY MEDICINE | Facility: CLINIC | Age: 39
End: 2024-10-30
Payer: COMMERCIAL

## 2024-10-30 PROCEDURE — 91320 SARSCV2 VAC 30MCG TRS-SUC IM: CPT

## 2024-10-30 PROCEDURE — 90480 ADMN SARSCOV2 VAC 1/ONLY CMP: CPT

## 2024-10-30 PROCEDURE — 90656 IIV3 VACC NO PRSV 0.5 ML IM: CPT

## 2024-10-30 PROCEDURE — 90471 IMMUNIZATION ADMIN: CPT

## 2024-11-21 DIAGNOSIS — Z11.3 SCREENING FOR VENEREAL DISEASE: Primary | ICD-10-CM

## 2024-11-23 ENCOUNTER — LAB (OUTPATIENT)
Dept: LAB | Facility: CLINIC | Age: 39
End: 2024-11-23
Payer: COMMERCIAL

## 2024-11-23 DIAGNOSIS — Z11.3 SCREENING FOR VENEREAL DISEASE: ICD-10-CM

## 2024-11-23 PROCEDURE — 86645 CMV ANTIBODY IGM: CPT

## 2024-11-23 PROCEDURE — 86780 TREPONEMA PALLIDUM: CPT

## 2024-11-23 PROCEDURE — 86803 HEPATITIS C AB TEST: CPT

## 2024-11-23 PROCEDURE — 87389 HIV-1 AG W/HIV-1&-2 AB AG IA: CPT

## 2024-11-23 PROCEDURE — 87491 CHLMYD TRACH DNA AMP PROBE: CPT

## 2024-11-23 PROCEDURE — 36415 COLL VENOUS BLD VENIPUNCTURE: CPT

## 2024-11-23 PROCEDURE — 86644 CMV ANTIBODY: CPT

## 2024-11-23 PROCEDURE — 87591 N.GONORRHOEAE DNA AMP PROB: CPT

## 2024-11-23 PROCEDURE — 87340 HEPATITIS B SURFACE AG IA: CPT

## 2024-11-23 PROCEDURE — 86704 HEP B CORE ANTIBODY TOTAL: CPT

## 2024-11-23 PROCEDURE — 86705 HEP B CORE ANTIBODY IGM: CPT

## 2024-11-24 LAB
HBV CORE AB SERPL QL IA: NONREACTIVE
HBV CORE IGM SERPL QL IA: NONREACTIVE
HBV SURFACE AG SERPL QL IA: NONREACTIVE
HCV AB SERPL QL IA: NONREACTIVE
HIV 1+2 AB+HIV1 P24 AG SERPL QL IA: NONREACTIVE

## 2024-11-25 LAB
C TRACH DNA SPEC QL PROBE+SIG AMP: NEGATIVE
CMV IGG SERPL IA-ACNC: <0.2 U/ML
CMV IGG SERPL IA-ACNC: NORMAL
CMV IGM SERPL IA-ACNC: 15.8 AU/ML
CMV IGM SERPL IA-ACNC: NEGATIVE
N GONORRHOEA DNA SPEC QL NAA+PROBE: NEGATIVE
T PALLIDUM AB SER QL: NONREACTIVE

## 2024-12-17 ENCOUNTER — OFFICE VISIT (OUTPATIENT)
Dept: FAMILY MEDICINE | Facility: CLINIC | Age: 39
End: 2024-12-17
Payer: COMMERCIAL

## 2024-12-17 VITALS
TEMPERATURE: 97.9 F | DIASTOLIC BLOOD PRESSURE: 72 MMHG | OXYGEN SATURATION: 100 % | SYSTOLIC BLOOD PRESSURE: 109 MMHG | RESPIRATION RATE: 16 BRPM | HEART RATE: 53 BPM

## 2024-12-17 DIAGNOSIS — Z12.4 SCREENING FOR CERVICAL CANCER: Primary | ICD-10-CM

## 2024-12-17 PROCEDURE — 99213 OFFICE O/P EST LOW 20 MIN: CPT | Performed by: NURSE PRACTITIONER

## 2024-12-17 PROCEDURE — 87624 HPV HI-RISK TYP POOLED RSLT: CPT | Performed by: NURSE PRACTITIONER

## 2024-12-17 SDOH — HEALTH STABILITY: PHYSICAL HEALTH: ON AVERAGE, HOW MANY DAYS PER WEEK DO YOU ENGAGE IN MODERATE TO STRENUOUS EXERCISE (LIKE A BRISK WALK)?: 3 DAYS

## 2024-12-17 ASSESSMENT — SOCIAL DETERMINANTS OF HEALTH (SDOH): HOW OFTEN DO YOU GET TOGETHER WITH FRIENDS OR RELATIVES?: ONCE A WEEK

## 2024-12-17 ASSESSMENT — PAIN SCALES - GENERAL: PAINLEVEL_OUTOF10: NO PAIN (0)

## 2024-12-17 NOTE — PROGRESS NOTES
Assessment & Plan     Screening for cervical cancer  Undergoing fertility treatment, was advised to get Pap done prior to starting therapy.  Due for preventative March 2024.    - HPV and Gynecologic Cytology Panel - Recommended Age 30-65 Years            Counseling  Appropriate preventive services were addressed with this patient via screening, questionnaire, or discussion as appropriate for fall prevention, nutrition, physical activity, Tobacco-use cessation, social engagement, weight loss and cognition.  Checklist reviewing preventive services available has been given to the patient.  Reviewed patient's diet, addressing concerns and/or questions.   She is at risk for lack of exercise and has been provided with information to increase physical activity for the benefit of her well-being.   She is at risk for psychosocial distress and has been provided with information to reduce risk.           Kirstin Lopez is a 39 year old, presenting for the following health issues:  Physical      12/17/2024    12:58 PM   Additional Questions   Roomed by Kaylie VAIL   Following [facility]  Was told to get a Pap  Done with diagnostics.             Objective    /72 (BP Location: Right arm, Patient Position: Sitting, Cuff Size: Adult Small)   Pulse 53   Temp 97.9  F (36.6  C) (Temporal)   Resp 16   LMP 11/27/2024 (Exact Date)   SpO2 100%   There is no height or weight on file to calculate BMI.  Physical Exam   GENERAL: alert and no distress   (female) w/bimanual: normal female external genitalia, normal urethral meatus, normal vaginal mucosa, normal cervix/adnexa/uterus without masses or discharge  MS: no gross musculoskeletal defects noted, no edema            Signed Electronically by: Fang Bryson DNP

## 2024-12-18 LAB
HPV HR 12 DNA CVX QL NAA+PROBE: NEGATIVE
HPV16 DNA CVX QL NAA+PROBE: NEGATIVE
HPV18 DNA CVX QL NAA+PROBE: NEGATIVE
HUMAN PAPILLOMA VIRUS FINAL DIAGNOSIS: NORMAL

## 2025-07-21 ENCOUNTER — OFFICE VISIT (OUTPATIENT)
Dept: FAMILY MEDICINE | Facility: CLINIC | Age: 40
End: 2025-07-21
Payer: COMMERCIAL

## 2025-07-21 VITALS
DIASTOLIC BLOOD PRESSURE: 66 MMHG | WEIGHT: 119.7 LBS | HEART RATE: 56 BPM | BODY MASS INDEX: 20.43 KG/M2 | RESPIRATION RATE: 16 BRPM | SYSTOLIC BLOOD PRESSURE: 120 MMHG | TEMPERATURE: 98.3 F | HEIGHT: 64 IN | OXYGEN SATURATION: 100 %

## 2025-07-21 DIAGNOSIS — Z00.00 ROUTINE GENERAL MEDICAL EXAMINATION AT A HEALTH CARE FACILITY: Primary | ICD-10-CM

## 2025-07-21 PROCEDURE — 99395 PREV VISIT EST AGE 18-39: CPT | Performed by: NURSE PRACTITIONER

## 2025-07-21 PROCEDURE — 1126F AMNT PAIN NOTED NONE PRSNT: CPT | Performed by: NURSE PRACTITIONER

## 2025-07-21 PROCEDURE — 3074F SYST BP LT 130 MM HG: CPT | Performed by: NURSE PRACTITIONER

## 2025-07-21 PROCEDURE — 3078F DIAST BP <80 MM HG: CPT | Performed by: NURSE PRACTITIONER

## 2025-07-21 SDOH — HEALTH STABILITY: PHYSICAL HEALTH: ON AVERAGE, HOW MANY DAYS PER WEEK DO YOU ENGAGE IN MODERATE TO STRENUOUS EXERCISE (LIKE A BRISK WALK)?: 3 DAYS

## 2025-07-21 SDOH — HEALTH STABILITY: PHYSICAL HEALTH: ON AVERAGE, HOW MANY MINUTES DO YOU ENGAGE IN EXERCISE AT THIS LEVEL?: 40 MIN

## 2025-07-21 ASSESSMENT — PAIN SCALES - GENERAL: PAINLEVEL_OUTOF10: NO PAIN (0)

## 2025-07-21 ASSESSMENT — SOCIAL DETERMINANTS OF HEALTH (SDOH): HOW OFTEN DO YOU GET TOGETHER WITH FRIENDS OR RELATIVES?: THREE TIMES A WEEK

## 2025-07-21 NOTE — PATIENT INSTRUCTIONS
Patient Education   Preventive Care Advice   This is general advice given by our system to help you stay healthy. However, your care team may have specific advice just for you. Please talk to your care team about your preventive care needs.  Nutrition  Eat 5 or more servings of fruits and vegetables each day.  Try wheat bread, brown rice and whole grain pasta (instead of white bread, rice, and pasta).  Get enough calcium and vitamin D. Check the label on foods and aim for 100% of the RDA (recommended daily allowance).  Lifestyle  Exercise at least 150 minutes each week  (30 minutes a day, 5 days a week).  Do muscle strengthening activities 2 days a week. These help control your weight and prevent disease.  No smoking.  Wear sunscreen to prevent skin cancer.  Have a dental exam and cleaning every 6 months.  Yearly exams  See your health care team every year to talk about:  Any changes in your health.  Any medicines your care team has prescribed.  Preventive care, family planning, and ways to prevent chronic diseases.  Shots (vaccines)   HPV shots (up to age 26), if you've never had them before.  Hepatitis B shots (up to age 59), if you've never had them before.  COVID-19 shot: Get this shot when it's due.  Flu shot: Get a flu shot every year.  Tetanus shot: Get a tetanus shot every 10 years.  Pneumococcal, hepatitis A, and RSV shots: Ask your care team if you need these based on your risk.  Shingles shot (for age 50 and up)  General health tests  Diabetes screening:  Starting at age 35, Get screened for diabetes at least every 3 years.  If you are younger than age 35, ask your care team if you should be screened for diabetes.  Cholesterol test: At age 39, start having a cholesterol test every 5 years, or more often if advised.  Bone density scan (DEXA): At age 50, ask your care team if you should have this scan for osteoporosis (brittle bones).  Hepatitis C: Get tested at least once in your life.  STIs (sexually  transmitted infections)  Before age 24: Ask your care team if you should be screened for STIs.  After age 24: Get screened for STIs if you're at risk. You are at risk for STIs (including HIV) if:  You are sexually active with more than one person.  You don't use condoms every time.  You or a partner was diagnosed with a sexually transmitted infection.  If you are at risk for HIV, ask about PrEP medicine to prevent HIV.  Get tested for HIV at least once in your life, whether you are at risk for HIV or not.  Cancer screening tests  Cervical cancer screening: If you have a cervix, begin getting regular cervical cancer screening tests starting at age 21.  Breast cancer scan (mammogram): If you've ever had breasts, begin having regular mammograms starting at age 40. This is a scan to check for breast cancer.  Colon cancer screening: It is important to start screening for colon cancer at age 45.  Have a colonoscopy test every 10 years (or more often if you're at risk) Or, ask your provider about stool tests like a FIT test every year or Cologuard test every 3 years.  To learn more about your testing options, visit:   .  For help making a decision, visit:   https://bit.ly/kk68346.  Prostate cancer screening test: If you have a prostate, ask your care team if a prostate cancer screening test (PSA) at age 55 is right for you.  Lung cancer screening: If you are a current or former smoker ages 50 to 80, ask your care team if ongoing lung cancer screenings are right for you.  For informational purposes only. Not to replace the advice of your health care provider. Copyright   2023 [employer]. All rights reserved. Clinically reviewed by the [facility] Transitions Program. [employer] 626973 - REV 01/24.

## 2025-07-21 NOTE — PROGRESS NOTES
Preventive Care Visit  [facility]  Fang Bryson, BEE, Nurse Practitioner Primary Care  Jul 21, 2025      Assessment & Plan     Routine general medical examination at a health care facility  Reviewed medical/social/family history and health maintenance   Pap: Completed last year, normal.  Mammo: Start this fall at age 40  Colon: Start age 45  Immunizations: up to date  Labs: Screening lipid and cholesterol are current.   Discussed healthy lifestyle and aging recommendations including regular exercise, adequate and regular sleep, 5+ fruits and veggies daily.    Patient has been advised of split billing requirements and indicates understanding: Yes    Counseling  Appropriate preventive services were addressed with this patient via screening, questionnaire, or discussion as appropriate for fall prevention, nutrition, physical activity, Tobacco-use cessation, social engagement, weight loss and cognition.  Checklist reviewing preventive services available has been given to the patient.  Reviewed patient's diet, addressing concerns and/or questions.   She is at risk for lack of exercise and has been provided with information to increase physical activity for the benefit of her well-being.   Reviewed preventive health counseling, as reflected in patient instructions        Kirstin Lopez is a 39 year old, presenting for the following:  Annual Visit        7/21/2025     9:00 AM   Additional Questions   Roomed by Joslyn VIEYRA CMA   Accompanied by Self         7/21/2025     9:00 AM   Patient Reported Additional Medications   Patient reports taking the following new medications Yes          HPI  Working with fertility clinic.  Did IVF egg retrieval cycle.  Hoping to do a transfer in a month or a couple months.  Will need to find an OBGYN.          Advance Care Planning    Discussed advance care planning with patient; however, patient declined at this time.        7/21/2025   General [facility]    How would you rate your overall physical health? Good   Feel stress (tense, anxious, or unable to sleep) Only a little   (!) STRESS CONCERN      7/21/2025   Nutrition   Three or more servings of calcium each day? Yes   Diet: Regular (no restrictions)   How many servings of fruit and vegetables per day? 4 or more   How many sweetened beverages each day? 0-1         7/21/2025   Exercise   Days per week of moderate/strenous exercise 3 days   Average minutes spent exercising at this level 40 min         7/21/2025   Social Factors   Frequency of gathering with friends or relatives Three times a week   Worry food won't last until get money to buy more No   Food not last or not have enough money for food? No   Do you have housing? (Housing is defined as stable permanent housing and does not include staying outside in a car, in a tent, in an abandoned building, in an overnight shelter, or couch-surfing.) Yes   Are you worried about losing your housing? No   Lack of transportation? No   Unable to get utilities (heat,electricity)? No         7/21/2025   Dental   Dentist two times every year? Yes         Today's PHQ-2 Score:       7/21/2025     8:54 AM   PHQ-2 ( 1999 [employer])   Q1: Little interest or pleasure in doing things 0   Q2: Feeling down, depressed or hopeless 0   PHQ-2 Score 0    Q1: Little interest or pleasure in doing things Not at all   Q2: Feeling down, depressed or hopeless Not at all   PHQ-2 Score 0       Patient-reported           7/21/2025   Substance Use   Alcohol more than 3/day or more than 7/wk No   Do you use any other substances recreationally? No     Social History     Tobacco Use    Smoking status: Never    Smokeless tobacco: Never   Substance Use Topics    Alcohol use: Yes     Comment: Occasional    Drug use: Never           3/1/2024   LAST FHS-7 RESULTS   1st degree relative breast or ovarian cancer No   Any relative bilateral breast cancer Unknown   Any male have breast cancer No   Any ONE woman  "have BOTH breast AND ovarian cancer No   Any woman with breast cancer before 50yrs No   2 or more relatives with breast AND/OR ovarian cancer Yes   2 or more relatives with breast AND/OR bowel cancer Yes        Mammogram Screening - Patient under 40 years of age: Routine Mammogram Screening not recommended.         7/21/2025   STI Screening   New sexual partner(s) since last STI/HIV test? No     History of abnormal Pap smear: No - age 30- 64 PAP with HPV every 5 years recommended        Latest Ref Rng & Units 12/17/2024     1:27 PM 8/25/2021     3:42 PM   PAP / HPV   PAP  Negative for Intraepithelial Lesion or Malignancy (NILM)     HPV 16 DNA Negative Negative     HPV 18 DNA Negative Negative     Other HR HPV Negative Negative     PAP-ABSTRACT   See Scanned Document            7/21/2025   Contraception/Family Planning   Questions about contraception or family planning No   What are your periods like? Regular        Reviewed and updated as needed this visit by Provider                             Objective    Exam  /66 (BP Location: Right arm, Patient Position: Sitting, Cuff Size: Adult Regular)   Pulse 56   Temp 98.3  F (36.8  C) (Temporal)   Resp 16   Ht 1.626 m (5' 4\")   Wt 54.3 kg (119 lb 11.2 oz)   SpO2 100%   BMI 20.55 kg/m     Estimated body mass index is 20.55 kg/m  as calculated from the following:    Height as of this encounter: 1.626 m (5' 4\").    Weight as of this encounter: 54.3 kg (119 lb 11.2 oz).    Physical Exam  GENERAL: alert and no distress  EYES: Eyes grossly normal to inspection, PERRL and conjunctivae and sclerae normal  HENT: ear canals and TM's normal, nose and mouth without ulcers or lesions  NECK: no adenopathy, no asymmetry, masses, or scars  RESP: lungs clear to auscultation - no rales, rhonchi or wheezes  BREAST: normal without masses, tenderness or nipple discharge and no palpable axillary masses or adenopathy  CV: regular rate and rhythm, normal S1 S2, no S3 or S4, no " murmur, click or rub, no peripheral edema  ABDOMEN: soft, nontender, no hepatosplenomegaly, no masses and bowel sounds normal  MS: no gross musculoskeletal defects noted, no edema  SKIN: no suspicious lesions or rashes  NEURO: Normal strength and tone, mentation intact and speech normal  PSYCH: mentation appears normal, affect normal/bright        Signed Electronically by: Fang Bryson, DNP